# Patient Record
Sex: FEMALE | Race: BLACK OR AFRICAN AMERICAN | NOT HISPANIC OR LATINO | URBAN - METROPOLITAN AREA
[De-identification: names, ages, dates, MRNs, and addresses within clinical notes are randomized per-mention and may not be internally consistent; named-entity substitution may affect disease eponyms.]

---

## 2019-05-06 ENCOUNTER — EMERGENCY (EMERGENCY)
Facility: HOSPITAL | Age: 19
LOS: 1 days | Discharge: HOME | End: 2019-05-06
Admitting: EMERGENCY MEDICINE
Payer: COMMERCIAL

## 2019-05-06 VITALS
RESPIRATION RATE: 17 BRPM | TEMPERATURE: 97 F | HEART RATE: 84 BPM | WEIGHT: 144.62 LBS | SYSTOLIC BLOOD PRESSURE: 129 MMHG | DIASTOLIC BLOOD PRESSURE: 73 MMHG | OXYGEN SATURATION: 99 %

## 2019-05-06 DIAGNOSIS — M25.561 PAIN IN RIGHT KNEE: ICD-10-CM

## 2019-05-06 PROCEDURE — 99282 EMERGENCY DEPT VISIT SF MDM: CPT

## 2019-05-06 NOTE — ED PROVIDER NOTE - OBJECTIVE STATEMENT
19 y/o f presents c/o right knee pain which started today while dancing, pt heard a "pop" as she turned.  Pt indicating pain is mostly to inside portion of knee, still able to walk without difficulty.  Denies numbness/tingling to ext, weakness, all other ROS negative.

## 2019-05-06 NOTE — ED PROVIDER NOTE - CARE PROVIDER_API CALL
Mikael Campbell)  Orthopaedic Surgery  [address] [place] [address]  Phone: [phone]  Fax: [phone]  Follow Up Time:     Jeet Valdez)  Orthopaedic Surgery  [address] [place] [id number]  Phone: [phone]  Fax: [phone]  Follow Up Time:

## 2019-05-06 NOTE — ED ADULT NURSE NOTE - OBJECTIVE STATEMENT
19 y/o female presents to the ED c/o right knee pain that began today while dancing. Pt reportedly heard a "pop". Pt able to ambulate but with pain. Pt reports limited ROM. Denies any other sx.

## 2019-05-06 NOTE — ED ADULT NURSE NOTE - MUSCULOSKELETAL WDL
other than stated in HPI, Full range of motion of upper and lower extremities, no joint tenderness/swelling.

## 2019-05-06 NOTE — ED PROVIDER NOTE - MUSCULOSKELETAL, MLM
right knee- +medial tenderness, no patellar or fibular head tenderness, no swelling or deformity, +FROM, strength 5/5, sensation intact distally

## 2019-05-06 NOTE — ED PROVIDER NOTE - CLINICAL SUMMARY MEDICAL DECISION MAKING FREE TEXT BOX
17 y/o f presents with right knee pain which occurred while dancing today; pt ambulatory, ext NVI, does not meet imaging criteria, not concerned for fracture although pt offered xray which she declined.  Suspect soft tissue injury (meniscus vs ligament), ace wrap applied, recommend RICE, NSAIDs, f/u ortho

## 2019-05-06 NOTE — ED ADULT TRIAGE NOTE - CHIEF COMPLAINT QUOTE
Patient complaining of right knee pain, states "I hurt it while dancing."  Patient denies any numbness, tingling or any other complaints at this time.

## 2019-05-06 NOTE — ED ADULT NURSE NOTE - NSIMPLEMENTINTERV_GEN_ALL_ED
Implemented All Universal Safety Interventions:  [place] to call system. Call bell, personal items and telephone within reach. Instruct patient to call for assistance. Room bathroom lighting operational. Non-slip footwear when patient is off stretcher. Physically safe environment: no spills, clutter or unnecessary equipment. Stretcher in lowest position, wheels locked, appropriate side rails in place.

## 2019-05-11 ENCOUNTER — EMERGENCY (EMERGENCY)
Facility: HOSPITAL | Age: 19
LOS: 1 days | Discharge: HOME | End: 2019-05-11
Attending: EMERGENCY MEDICINE | Admitting: EMERGENCY MEDICINE
Payer: COMMERCIAL

## 2019-05-11 VITALS
SYSTOLIC BLOOD PRESSURE: 141 MMHG | DIASTOLIC BLOOD PRESSURE: 93 MMHG | OXYGEN SATURATION: 100 % | TEMPERATURE: 98 F | HEART RATE: 65 BPM | WEIGHT: 130.07 LBS | RESPIRATION RATE: 18 BRPM | HEIGHT: 69 IN
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
## 2019-05-11 DIAGNOSIS — T78.1XXA OTHER ADVERSE FOOD REACTIONS, NOT ELSEWHERE CLASSIFIED, INITIAL ENCOUNTER: ICD-10-CM
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
## 2019-05-11 PROCEDURE — 99053 MED SERV 10PM-8AM 24 HR FAC: CPT

## 2019-05-11 PROCEDURE — 99284 EMERGENCY DEPT VISIT MOD MDM: CPT | Mod: 25

## 2019-05-11 PROCEDURE — 96374 THER/PROPH/DIAG INJ IV PUSH: CPT

## 2019-05-11 PROCEDURE — 96375 TX/PRO/DX INJ NEW DRUG ADDON: CPT

## 2019-05-11 RX ORDER — DIPHENHYDRAMINE HCL 50 MG
25 CAPSULE ORAL EVERY 6 HOURS
Refills: 0 | Status: DISCONTINUED | OUTPATIENT
Start: 2019-05-11 | End: 2019-05-15

## 2019-05-11 RX ORDER — SODIUM CHLORIDE 9 MG/ML
1000 INJECTION INTRAMUSCULAR; INTRAVENOUS; SUBCUTANEOUS ONCE
Refills: 0 | Status: COMPLETED | OUTPATIENT
Start: 2019-05-11 | End: 2019-05-11

## 2019-05-11 RX ORDER — FAMOTIDINE 10 MG/ML
1 INJECTION INTRAVENOUS
Qty: 4 | Refills: 0
Start: 2019-05-11 | End: 2019-05-14

## 2019-05-11 RX ORDER — DIPHENHYDRAMINE HCL 50 MG
1 CAPSULE ORAL
Qty: 12 | Refills: 0
Start: 2019-05-11 | End: 2019-05-14

## 2019-05-11 RX ORDER — FAMOTIDINE 10 MG/ML
20 INJECTION INTRAVENOUS ONCE
Refills: 0 | Status: COMPLETED | OUTPATIENT
Start: 2019-05-11 | End: 2019-05-11

## 2019-05-11 RX ADMIN — FAMOTIDINE 20 MILLIGRAM(S): 10 INJECTION INTRAVENOUS at 03:43

## 2019-05-11 RX ADMIN — SODIUM CHLORIDE 2000 MILLILITER(S): 9 INJECTION INTRAMUSCULAR; INTRAVENOUS; SUBCUTANEOUS at 03:45

## 2019-05-11 RX ADMIN — Medication 25 MILLIGRAM(S): at 03:43

## 2019-05-11 RX ADMIN — Medication 125 MILLIGRAM(S): at 03:44

## 2019-05-11 NOTE — ED PROVIDER NOTE - NSFOLLOWUPINSTRUCTIONS_ED_ALL_ED_FT
I have discussed the discharge plan with the patient. The patient agrees with the plan, as discussed.  The patient understands Emergency Department diagnosis is a preliminary diagnosis often based on limited information and that the patient must adhere to the follow-up plan as discussed.  The patient understands that if the symptoms worsen or if prescribed medications do not have the desired/planned effect that the patient may return to the Emergency Department at any time for further evaluation and treatment.      Food Allergy      Allergies, Adult  An allergy is when your body's defense system (immune system) overreacts to an otherwise harmless substance (allergen) that you breathe in or eat or something that touches your skin. When you come into contact with something that you are allergic to, your immune system produces certain proteins (antibodies). These proteins cause cells to release chemicals (histamines) that trigger the symptoms of an allergic reaction.    Allergies often affect the nasal passages (allergic rhinitis), eyes (allergic conjunctivitis), skin (atopic dermatitis), and stomach. Allergies can be mild or severe. Allergies cannot spread from person to person (are not contagious). They can develop at any age and may be outgrown.    What increases the risk?  You may be at greater risk of allergies if other people in your family have allergies.    What are the signs or symptoms?  Symptoms depend on what type of allergy you have. They may include:  Runny, stuffy nose.  Sneezing.  Itchy mouth, ears, or throat.  Postnasal drip.  Sore throat.  Itchy, red, watery, or puffy eyes.  Skin rash or hives.  Stomach pain.  Vomiting.  Diarrhea.  Bloating.  Wheezing or coughing.  People with a severe allergy to food, medicine, or an insect bite may have a life-threatening allergic reaction (anaphylaxis). Symptoms of anaphylaxis include:  Hives.  Itching.  Flushed face.  Swollen lips, tongue, or mouth.  Tight or swollen throat.  Chest pain or tightness in the chest.  Trouble breathing or shortness of breath.  Rapid heartbeat.  Dizziness or fainting.  Vomiting.  Diarrhea.  Pain in the abdomen.  How is this diagnosed?  This condition is diagnosed based on:  Your symptoms.  Your family and medical history.  A physical exam.  You may need to see a health care provider who specializes in treating allergies (allergist). You may also have tests, including:  Skin tests to see which allergens are causing your symptoms, such as:  Skin prick test. In this test, your skin is pricked with a tiny needle and exposed to small amounts of possible allergens to see if your skin reacts.  Intradermal skin test. In this test, a small amount of allergen is injected under your skin to see if your skin reacts.  Patch test. In this test, a small amount of allergen is placed on your skin and then your skin is covered with a bandage. Your health care provider will check your skin after a couple of days to see if a rash has developed.  Blood tests.  Challenges tests. In this test, you inhale a small amount of allergen by mouth to see if you have an allergic reaction.  You may also be asked to:  Keep a food diary. A food diary is a record of all the foods and drinks you have in a day and any symptoms you experience.  Practice an elimination diet. An elimination diet involves eliminating specific foods from your diet and then adding them back in one by one to find out if a certain food causes an allergic reaction.  How is this treated?  Treatment for allergies depends on your symptoms. Treatment may include:  Cold compresses to soothe itching and swelling.  Eye drops.  Nasal sprays.  Using a saline spray or container (neti pot) to flush out the nose (nasal irrigation). These methods can help clear away mucus and keep the nasal passages moist.  Using a humidifier.  Oral antihistamines or other medicines to block allergic reaction and inflammation.  Skin creams to treat rashes or itching.  Diet changes to eliminate food allergy triggers.  Repeated exposure to tiny amounts of allergens to build up a tolerance and prevent future allergic reactions (immunotherapy). These include:  Allergy shots.  Oral treatment. This involves taking small doses of an allergen under the tongue (sublingual immunotherapy).  Emergency epinephrine injection (auto-injector) in case of an allergic emergency. This is a self-injectable, pre-measured medicine that must be given within the first few minutes of a serious allergic reaction.  Follow these instructions at home:  Image Image Image   Avoid known allergens whenever possible.  If you suffer from airborne allergens, wash out your nose daily. You can do this with a saline spray or a neti pot to flush out your nose (nasal irrigation).  Take over-the-counter and prescription medicines only as told by your health care provider.  Keep all follow-up visits as told by your health care provider. This is important.  If you are at risk of a severe allergic reaction (anaphylaxis), keep your auto-injector with you at all times.  If you have ever had anaphylaxis, wear a medical alert bracelet or necklace that states you have a severe allergy.  Contact a health care provider if:  Your symptoms do not improve with treatment.  Get help right away if:  You have symptoms of anaphylaxis, such as:  Swollen mouth, tongue, or throat.  Pain or tightness in your chest.  Trouble breathing or shortness of breath.  Dizziness or fainting.  Severe abdominal pain, vomiting, or diarrhea.  This information is not intended to replace advice given to you by your health care provider. Make sure you discuss any questions you have with your health care provider.    Document Released: 03/12/2004 Document Revised: 04/18/2018 Document Reviewed: 07/05/2017  [employer] Interactive Patient Education © 2019 [employer]A food allergy is an abnormal reaction to a food (food allergen) by the body's defense system (immune system). Foods that commonly cause allergies include:  Milk.  Seafood.  Eggs.  Peanuts.  Tree nuts such as pecans, walnuts, and cashews.  Wheat.  Soy.  What are the causes?  Food allergies happen when the immune system sees a food as harmful and releases chemicals (antibodies) to fight it.    What are the signs or symptoms?  Symptoms may be mild or severe. They usually start minutes after eating the food, but they can occur even a few hours later. In people with a severe allergy, symptoms can start within seconds.    Mild symptoms of this condition include:  Congested nose.  Tingling in the mouth.  An itchy, red rash.  Vomiting.  Diarrhea.  In people with a severe allergy, a life-threatening reaction can occur called anaphylaxis. Get help right away if you have symptoms of anaphylaxis, such as:  Feeling warm in the face (flushed). This may include redness.  Itchy, red, swollen areas of skin (hives).  Swelling of the eyes, lips, face, mouth, tongue, or throat.  Difficulty breathing, speaking, or swallowing.  Noisy breathing (wheezing).  Dizziness or light-headedness.  Fainting.  Pain or cramping in the abdomen.  How is this diagnosed?  This condition may be diagnosed based on:  A physical exam.  Your medical history.  Skin tests.  Blood tests.  A food challenge test. This test involves eating the food that may be causing the allergic response while being monitored for a reaction by your health care provider.  The results of an elimination diet. The elimination diet involves removing foods from your diet and then adding them back in, one at a time.  A food diary.  How is this treated?  There is no cure for food allergies. Treatment focuses on preventing exposure to the food or foods you are allergic to and treating reactions if you are exposed to the food. Mild symptoms may not need treatment.     Severe reactions usually need to be treated at a hospital. Treatment may include:  Medicines that help:  Tighten your blood vessels (epinephrine).  Relieve itching and hives (antihistamines).  Widen the narrow and tight airways (bronchodilators).  Reduce swelling (corticosteroids).  Oxygen therapy to help you breathe.  IV fluids to keep you hydrated.  After a severe reaction, you may be given rescue medicines, such as:  An anaphylaxis kit.  An epinephrine injection, commonly called an auto-injector "pen" (pre-filled automatic epinephrine injection device).  Your health care provider may teach you how to use these if you are accidentally exposed to an allergen.    Follow these instructions at home:  If you have a potential allergy:     Follow the elimination diet as told by your health care provider.  Keep a food diary as told by your health care provider. Every day, write down:  What you eat and drink and when.  What symptoms you have and when.  If you have a severe allergy:     Image   Wear a medical alert bracelet or necklace that describes your allergy.  Carry your anaphylaxis kit or an auto-injector pen with you at all times. Use them as told by your health care provider.  Make sure that you, your family members, and your employer know:  The signs of anaphylaxis.  How to use an anaphylaxis kit.  How to use an auto-injector pen.  If you think that you are having an anaphylactic reaction, use your auto-injector pen or anaphylaxis kit.  Replace your auto-injector pen immediately after use, in case you have another reaction.  Get medical care after use your auto-injector pen. This is important because you can have a delayed, life-threatening reaction after taking the medicine (rebound anaphylaxis).  General instructions     Avoid the foods that you are allergic to.  Read food labels before you eat packaged items. Look for ingredients that you are allergic to.  When you are at a restaurant, tell your  that you have an allergy. If you are unsure whether a meal has an ingredient that you are allergic to, ask your .  Take over-the-counter and prescription medicines only as told by your health care provider.   Do not drive until the medicine has worn off, unless your health care provider gives you approval.  Inform all health care providers that you have a food allergy.  If you think that you might be allergic to something else, talk with your health care provider. Do not eat a food to see if you are allergic to it without talking with your health care provider first.  Contact a health care provider if you:  Have symptoms that do not go away within 2 days.  Have symptoms that get worse.  Have new symptoms.  Get help right away if you have symptoms of anaphylaxis:  Flushed skin.  Hives.  Swelling of the eyes, lips, face, mouth, tongue, or throat.  Difficulty breathing, speaking, or swallowing.  Wheezing.  Dizziness or light-headedness.  Fainting.  Pain or cramping in the abdomen.  These symptoms may represent a serious problem that is an emergency. Do not wait to see if the symptoms will go away. Use your auto-injector pen or anaphylaxis kit as you have been told. Get medical help right away. Call your local emergency services (911 in the [place] Do not drive yourself to the hospital.     If you needed to use an auto-injector pen, you need more medical care even if the medicine seems to be helping. This is important because anaphylaxis may happen again within 72 hours.    Summary  A food allergy is an abnormal reaction to a food (food allergen) by the body's defense system (immune system).  There is no cure for food allergies. Treatment focuses on preventing exposure to the food or foods you are allergic to and treating reactions if you are exposed to the food.  Wear a medical alert bracelet or necklace that describes your allergy.  If you have symptoms of anaphylaxis, use your auto-injector pen or anaphylaxis kit as you have been instructed, and get medical help right away.  This information is not intended to replace advice given to you by your health care provider. Make sure you discuss any questions you have with your health care provider.

## 2019-05-11 NOTE — ED PROVIDER NOTE - ATTENDING CONTRIBUTION TO CARE
18F c/o possible allergic reaction. pt states ate walnut a few hours ago an now feeling itching to throat.  states allergic to tree nuts. no cough, no sob, no vomiting.   gen- nad  heent- ncat, clear conj  cv -rrr  lungs -ctab  abd - soft, nt, nd  ext -wwp, no edema  neuro -aox3, steady gait, do  no airway compromise, no resp distress, feeling better after benadryl, pepcid, solumedrol.

## 2019-05-11 NOTE — ED ADULT TRIAGE NOTE - CHIEF COMPLAINT QUOTE
possible allergic reaction, pt ate walnut 4 hours ago since then pt feel itchy in her throat now feeling more tight. pt allergy to tree nuts

## 2019-05-11 NOTE — ED PROVIDER NOTE - OBJECTIVE STATEMENT
17yo female in the ER c/o scratchy  sensation and tightness in her throat. Pt reports she had  some walnuts  tonight and noted her throat became tight shortly after. Pt was waiting that symptoms would resolved but it didn't go away. Denies any rash, denies  facial edema, denies difficulty swallowing or breathing.

## 2019-05-11 NOTE — ED ADULT NURSE NOTE - CHPI ED NUR SYMPTOMS NEG
no wheezing/no swelling of face, tongue/no difficulty breathing/no congestion/no throat itching/no nausea/no shortness of breath/no rash/no difficulty swallowing/no vomiting

## 2023-05-05 NOTE — ED PROVIDER NOTE - NSFOLLOWUPINSTRUCTIONS_ED_ALL_ED_FT
<-- Click to add NO significant Past Surgical History R.I.C.E. Treatment    WHAT YOU NEED TO KNOW:    R.I.C.E. treatment is a 4-step process used to decrease swelling and pain caused by an injury. R.I.C.E. stands for rest, ice, compression, and elevation. R.I.C.E. should be done within 24 to 48 hours after an injury.Ice and Elevation     Ice and Elevation         DISCHARGE INSTRUCTIONS:    Return to the emergency department if:     Your pain is severe.      You have severe swelling or deformity.      You have numbness in the injured area.    Contact your healthcare provider if:     Your pain and swelling does not go away after a few days.      You have questions or concerns about your condition or care.    How to use R.I.C.E. treatment:     Rest your injured area as directed. You may need to stop using, or keep weight off, the injury for 48 hours or longer. Your healthcare provider may recommend crutches or another device. Return to your usual activities as directed.       Apply ice on your injured area for 15 to 20 minutes every 4 hours or as directed. Use an ice pack, or put crushed ice in a plastic bag. Cover it with a towel. Ice helps prevent tissue damage and decreases swelling and pain.      Compress, or keep pressure on, the injured area. Compression will help decrease swelling and support the injured area. Use an elastic bandage, air stirrup, splint, or sling as directed. If you use an elastic bandage to wrap your injured area, make sure the bandage is not too tight.       Elevate the injured area above the level of your heart as often as you can. This will help decrease swelling and pain. Prop the injured area on pillows or blankets to keep it elevated comfortably.     Follow up with your healthcare provider as directed: Write down your questions so you remember to ask them during your visits.